# Patient Record
Sex: FEMALE | Race: WHITE | NOT HISPANIC OR LATINO | Employment: UNEMPLOYED | URBAN - METROPOLITAN AREA
[De-identification: names, ages, dates, MRNs, and addresses within clinical notes are randomized per-mention and may not be internally consistent; named-entity substitution may affect disease eponyms.]

---

## 2018-10-13 ENCOUNTER — APPOINTMENT (OUTPATIENT)
Dept: RADIOLOGY | Facility: MEDICAL CENTER | Age: 33
End: 2018-10-13
Payer: MEDICAID

## 2018-10-13 ENCOUNTER — HOSPITAL ENCOUNTER (EMERGENCY)
Facility: MEDICAL CENTER | Age: 33
End: 2018-10-13
Attending: EMERGENCY MEDICINE
Payer: MEDICAID

## 2018-10-13 VITALS
TEMPERATURE: 98.6 F | RESPIRATION RATE: 18 BRPM | DIASTOLIC BLOOD PRESSURE: 81 MMHG | WEIGHT: 203.71 LBS | OXYGEN SATURATION: 96 % | BODY MASS INDEX: 28.52 KG/M2 | HEART RATE: 67 BPM | SYSTOLIC BLOOD PRESSURE: 125 MMHG | HEIGHT: 71 IN

## 2018-10-13 DIAGNOSIS — M79.602 MUSCULOSKELETAL ARM PAIN, LEFT: ICD-10-CM

## 2018-10-13 DIAGNOSIS — R07.9 CHEST PAIN, UNSPECIFIED TYPE: ICD-10-CM

## 2018-10-13 LAB
ALBUMIN SERPL BCP-MCNC: 4.1 G/DL (ref 3.2–4.9)
ALBUMIN/GLOB SERPL: 1.2 G/DL
ALP SERPL-CCNC: 65 U/L (ref 30–99)
ALT SERPL-CCNC: 17 U/L (ref 2–50)
ANION GAP SERPL CALC-SCNC: 8 MMOL/L (ref 0–11.9)
APTT PPP: 27.5 SEC (ref 24.7–36)
AST SERPL-CCNC: 15 U/L (ref 12–45)
BASOPHILS # BLD AUTO: 0.8 % (ref 0–1.8)
BASOPHILS # BLD: 0.05 K/UL (ref 0–0.12)
BILIRUB SERPL-MCNC: 0.5 MG/DL (ref 0.1–1.5)
BNP SERPL-MCNC: 10 PG/ML (ref 0–100)
BUN SERPL-MCNC: 11 MG/DL (ref 8–22)
CALCIUM SERPL-MCNC: 9 MG/DL (ref 8.5–10.5)
CHLORIDE SERPL-SCNC: 106 MMOL/L (ref 96–112)
CO2 SERPL-SCNC: 22 MMOL/L (ref 20–33)
CREAT SERPL-MCNC: 0.63 MG/DL (ref 0.5–1.4)
EKG IMPRESSION: NORMAL
EOSINOPHIL # BLD AUTO: 0.04 K/UL (ref 0–0.51)
EOSINOPHIL NFR BLD: 0.6 % (ref 0–6.9)
ERYTHROCYTE [DISTWIDTH] IN BLOOD BY AUTOMATED COUNT: 38.5 FL (ref 35.9–50)
GLOBULIN SER CALC-MCNC: 3.5 G/DL (ref 1.9–3.5)
GLUCOSE SERPL-MCNC: 94 MG/DL (ref 65–99)
HCT VFR BLD AUTO: 40.4 % (ref 37–47)
HGB BLD-MCNC: 13.5 G/DL (ref 12–16)
IMM GRANULOCYTES # BLD AUTO: 0.01 K/UL (ref 0–0.11)
IMM GRANULOCYTES NFR BLD AUTO: 0.2 % (ref 0–0.9)
INR PPP: 1.06 (ref 0.87–1.13)
LIPASE SERPL-CCNC: 48 U/L (ref 11–82)
LYMPHOCYTES # BLD AUTO: 1.96 K/UL (ref 1–4.8)
LYMPHOCYTES NFR BLD: 30.2 % (ref 22–41)
MCH RBC QN AUTO: 27.2 PG (ref 27–33)
MCHC RBC AUTO-ENTMCNC: 33.4 G/DL (ref 33.6–35)
MCV RBC AUTO: 81.5 FL (ref 81.4–97.8)
MONOCYTES # BLD AUTO: 0.48 K/UL (ref 0–0.85)
MONOCYTES NFR BLD AUTO: 7.4 % (ref 0–13.4)
NEUTROPHILS # BLD AUTO: 3.95 K/UL (ref 2–7.15)
NEUTROPHILS NFR BLD: 60.8 % (ref 44–72)
NRBC # BLD AUTO: 0 K/UL
NRBC BLD-RTO: 0 /100 WBC
PLATELET # BLD AUTO: 203 K/UL (ref 164–446)
PMV BLD AUTO: 10.5 FL (ref 9–12.9)
POTASSIUM SERPL-SCNC: 3.9 MMOL/L (ref 3.6–5.5)
PROT SERPL-MCNC: 7.6 G/DL (ref 6–8.2)
PROTHROMBIN TIME: 13.9 SEC (ref 12–14.6)
RBC # BLD AUTO: 4.96 M/UL (ref 4.2–5.4)
SODIUM SERPL-SCNC: 136 MMOL/L (ref 135–145)
TROPONIN I SERPL-MCNC: <0.01 NG/ML (ref 0–0.04)
WBC # BLD AUTO: 6.5 K/UL (ref 4.8–10.8)

## 2018-10-13 PROCEDURE — 85730 THROMBOPLASTIN TIME PARTIAL: CPT

## 2018-10-13 PROCEDURE — 85610 PROTHROMBIN TIME: CPT

## 2018-10-13 PROCEDURE — 83880 ASSAY OF NATRIURETIC PEPTIDE: CPT

## 2018-10-13 PROCEDURE — 36415 COLL VENOUS BLD VENIPUNCTURE: CPT

## 2018-10-13 PROCEDURE — 71045 X-RAY EXAM CHEST 1 VIEW: CPT

## 2018-10-13 PROCEDURE — 84484 ASSAY OF TROPONIN QUANT: CPT

## 2018-10-13 PROCEDURE — 700111 HCHG RX REV CODE 636 W/ 250 OVERRIDE (IP): Performed by: EMERGENCY MEDICINE

## 2018-10-13 PROCEDURE — 93005 ELECTROCARDIOGRAM TRACING: CPT | Performed by: EMERGENCY MEDICINE

## 2018-10-13 PROCEDURE — 80053 COMPREHEN METABOLIC PANEL: CPT

## 2018-10-13 PROCEDURE — 83690 ASSAY OF LIPASE: CPT

## 2018-10-13 PROCEDURE — 93005 ELECTROCARDIOGRAM TRACING: CPT

## 2018-10-13 PROCEDURE — A9270 NON-COVERED ITEM OR SERVICE: HCPCS | Performed by: EMERGENCY MEDICINE

## 2018-10-13 PROCEDURE — 85025 COMPLETE CBC W/AUTO DIFF WBC: CPT

## 2018-10-13 PROCEDURE — 700102 HCHG RX REV CODE 250 W/ 637 OVERRIDE(OP): Performed by: EMERGENCY MEDICINE

## 2018-10-13 PROCEDURE — 99284 EMERGENCY DEPT VISIT MOD MDM: CPT

## 2018-10-13 RX ORDER — PREDNISONE 20 MG/1
20 TABLET ORAL ONCE
Status: COMPLETED | OUTPATIENT
Start: 2018-10-13 | End: 2018-10-13

## 2018-10-13 RX ORDER — METHYLPREDNISOLONE 4 MG/1
TABLET ORAL
Qty: 1 KIT | Refills: 0 | Status: SHIPPED | OUTPATIENT
Start: 2018-10-13

## 2018-10-13 RX ORDER — IBUPROFEN 600 MG/1
600 TABLET ORAL ONCE
Status: COMPLETED | OUTPATIENT
Start: 2018-10-13 | End: 2018-10-13

## 2018-10-13 RX ADMIN — LIDOCAINE HYDROCHLORIDE 30 ML: 20 SOLUTION OROPHARYNGEAL at 14:22

## 2018-10-13 RX ADMIN — PREDNISONE 20 MG: 20 TABLET ORAL at 15:28

## 2018-10-13 RX ADMIN — IBUPROFEN 600 MG: 600 TABLET, FILM COATED ORAL at 15:28

## 2018-10-13 ASSESSMENT — PAIN SCALES - GENERAL: PAINLEVEL_OUTOF10: 6

## 2018-10-13 ASSESSMENT — LIFESTYLE VARIABLES: DO YOU DRINK ALCOHOL: NO

## 2018-10-13 NOTE — ED PROVIDER NOTES
"ED Provider Note    Scribed for Umer Lucia M.D. by Barbie Ndiaye. 10/13/2018  2:01 PM    Primary care provider: PCP, Pt states none  Means of arrival: Walk-in  History obtained from: Patient  History limited by: None    CHIEF COMPLAINT  Chief Complaint   Patient presents with   • Chest Pain     in left side radiating to arm x 2 days. describes pain as intermittent and achy, worse during movement or deep inspiration.   • Cough     dry cough x 2 days.        HPI  Rupa Collins is a 33 y.o. female who presents to the Emergency Department for evaluation of pressure-like pain to her left upper chest with radiation to her back, left shoulder, and left arm, onset 2 days ago. Patient denies any right chest pain. Her pain worsens with deep breathing, coughing, and exertion. She has had a cough since yesterday and has had increased frequency of burping. Patient denies any abdominal tenderness, shortness of breath, diaphoresis, leg pain, or fevers. She did not engage in any physical activity prior to symptom onset. No family history of blood clots.     REVIEW OF SYSTEMS  Pertinent negatives include no abdominal tenderness, shortness of breath, diaphoresis, leg pain, fevers. As above, all other systems reviewed and are negative.   See HPI for further details.     PAST MEDICAL HISTORY       SURGICAL HISTORY  patient denies any surgical history    SOCIAL HISTORY  Social History   Substance Use Topics   • Smoking status: Not noted   • Smokeless tobacco: Not noted   • Alcohol use Not noted      History   Drug use: Unknown       FAMILY HISTORY  None noted     CURRENT MEDICATIONS  None noted    ALLERGIES  No Known Allergies    PHYSICAL EXAM  VITAL SIGNS: /81   Pulse 94   Temp 37 °C (98.6 °F) (Temporal)   Resp 16   Ht 1.8 m (5' 10.87\")   Wt 92.4 kg (203 lb 11.3 oz)   SpO2 99%   BMI 28.52 kg/m²     Constitutional: Well developed, Well nourished, No acute distress, Non-toxic appearance.   HENT: Normocephalic, " Atraumatic, Bilateral external ears normal, Oropharynx is clear mucous membranes are moist. No oral exudates or nasal discharge.   Eyes: Pupils are equal round and reactive, EOMI, Conjunctiva normal, No discharge.   Neck: Normal range of motion, No tenderness, Supple, No stridor. No meningismus.  Lymphatic: No lymphadenopathy noted.   Cardiovascular: Regular rate and rhythm without murmur rub or gallop.  Thorax & Lungs: Clear breath sounds bilaterally without wheezes, rhonchi or rales. Tenderness to palpation to left lower lateral chest wall and left anterior shoulder.   Abdomen: Moderately obese, Soft non-tender non-distended. There is no rebound or guarding. No organomegaly is appreciated.   Skin: Normal without rash.   Extremities: Intact distal pulses, No edema, No lower extremity tenderness, No cyanosis, No clubbing. Capillary refill is less than 2 seconds.  Musculoskeletal: Point tenderness to bicipital insertion of left shoulder with forced resistance. No major deformities noted.   Neurologic: Alert & oriented x 3, Normal motor function, Normal sensory function, No focal deficits noted. Reflexes are normal.  Psychiatric: Affect normal, Judgment normal, Mood normal. There is no suicidal ideation or patient reported hallucinations.       DIAGNOSTIC STUDIES / PROCEDURES    LABS  Labs Reviewed   CBC WITH DIFFERENTIAL - Abnormal; Notable for the following:        Result Value    MCHC 33.4 (*)     All other components within normal limits    Narrative:     Indicate which anticoagulants the patient is on:->UNKNOWN   TROPONIN    Narrative:     Indicate which anticoagulants the patient is on:->UNKNOWN   BTYPE NATRIURETIC PEPTIDE    Narrative:     Indicate which anticoagulants the patient is on:->UNKNOWN   COMP METABOLIC PANEL    Narrative:     Indicate which anticoagulants the patient is on:->UNKNOWN   PROTHROMBIN TIME    Narrative:     Indicate which anticoagulants the patient is on:->UNKNOWN   APTT    Narrative:      Indicate which anticoagulants the patient is on:->UNKNOWN   LIPASE    Narrative:     Indicate which anticoagulants the patient is on:->UNKNOWN   ESTIMATED GFR    Narrative:     Indicate which anticoagulants the patient is on:->UNKNOWN   All labs reviewed by me.    EKG Interpretation:  Results for orders placed or performed during the hospital encounter of 10/13/18   EKG   Result Value Ref Range    Report       [facility]    Test Date:  2018-10-13  Pt Name:    BOB BRYSON                 Department: ER  MRN:        [id number]                      Room:  Gender:     Female                       Technician: [id number]  :        1985                   Requested By:ER TRIAGE PROTOCOL  Order #:    [id number]                    Reading MD:    Measurements  Intervals                                Axis  Rate:       87                           P:          55  OR:         120                          QRS:        -41  QRSD:       104                          T:          41  QT:         364  QTc:        438    Interpretive Statements  SINUS RHYTHM  LEFT AXIS DEVIATION  No previous ECG available for comparison           RADIOLOGY  DX-CHEST-LIMITED (1 VIEW)   Final Result      No radiographic evidence of acute cardiopulmonary process.      The radiologist's interpretation of all radiological studies have been reviewed by me.    COURSE & MEDICAL DECISION MAKING  Nursing notes, VS, PMSFHx reviewed in chart.    2:01 PM Patient seen and examined at bedside. PERC score 0. Ordered for DX Chest, Troponin, BNP, CBC, CMP, Prothrombin Time, APTT, Lipase, EKG to evaluate. Patient was treated with GI Cocktail for her symptoms.      Chest x-ray was unremarkable showing no evidence of cardiomegaly or airspace disease.    Laboratory evaluation reveals normal troponin, no leukocytosis, shift, anemia, electrolyte derangements, acidosis.  INR is normal.    3:19 PM Patient has point tenderness to her left  bicipital insertion on re-evaluation. I believe the etiology of her symptoms is likely musculoskeletal. Will treat patient with Deltasone 20 mg and Motrin 600 mg. The patient will be discharged with a Medrol Dosepak and should return if symptoms worsen or if new symptoms arise. The patient understands and agrees to plan.       DISPOSITION:  Patient will be discharged home in stable condition.    FOLLOW UP:  Ibrahima Sequeira M.D.  [address] [facility] [address]  [phone]          OUTPATIENT MEDICATIONS:  New Prescriptions    METHYLPREDNISOLONE (MEDROL DOSEPAK) 4 MG TABLET THERAPY PACK    Use as directed       FINAL IMPRESSION  1. Musculoskeletal arm pain, left    2. Chest pain, unspecified type          I, Barbie Ndiaye (Louisaibe), am scribing for, and in the presence of, Umer Lucia M.D..    Electronically signed by: Barbie Ndiaye (Scribe), 10/13/2018    IUmer M.D. personally performed the services described in this documentation, as scribed by Barbie Ndiaye in my presence, and it is both accurate and complete. C.     The note accurately reflects work and decisions made by me.  Umer Lucia  10/13/2018  3:31 PM

## 2018-10-13 NOTE — ED TRIAGE NOTES
Chief Complaint   Patient presents with   • Chest Pain     in left side radiating to arm x 2 days. describes pain as intermittent and achy, worse during movement or deep inspiration.   • Cough     dry cough x 2 days.      Denies cardiac history. Pt speak Setswana,  translates for the pt. Educated on triage process. Instructed to notify staff for any worsening symptoms.

## 2018-10-13 NOTE — ED NOTES
Pt medicated per orders - see MAR for details. VSS. PIV DC'd intact. Pt given DC instructions, including medication education, with verbalized understanding. Ambulatory to exit with steady gait.

## 2018-12-10 ENCOUNTER — HOSPITAL ENCOUNTER (OUTPATIENT)
Dept: LAB | Facility: MEDICAL CENTER | Age: 33
End: 2018-12-10
Attending: OBSTETRICS & GYNECOLOGY
Payer: MEDICAID

## 2018-12-10 LAB
FSH SERPL-ACNC: 6.7 MIU/ML
LH SERPL-ACNC: 9 IU/L
PROLACTIN SERPL-MCNC: 16.48 NG/ML (ref 2.8–26)
T4 FREE SERPL-MCNC: 0.78 NG/DL (ref 0.53–1.43)
TSH SERPL DL<=0.005 MIU/L-ACNC: 2.81 UIU/ML (ref 0.38–5.33)

## 2018-12-10 PROCEDURE — 84146 ASSAY OF PROLACTIN: CPT

## 2018-12-10 PROCEDURE — 83001 ASSAY OF GONADOTROPIN (FSH): CPT

## 2018-12-10 PROCEDURE — 84443 ASSAY THYROID STIM HORMONE: CPT

## 2018-12-10 PROCEDURE — 84439 ASSAY OF FREE THYROXINE: CPT

## 2018-12-10 PROCEDURE — 36415 COLL VENOUS BLD VENIPUNCTURE: CPT

## 2018-12-10 PROCEDURE — 83002 ASSAY OF GONADOTROPIN (LH): CPT

## 2022-08-03 NOTE — ED NOTES
Acute pyelonephritis Noted on CT scan, coupled with flank pain  Allergic to cephalexin, recently on levaquin outpatient  Started on IV ciprofloxacin by the ED  Unclear if her allergy to cephalexin extends to all penicillins and cephalosporins, would continue with ciprofloxacin for now  -ID following, on cipro. Urine cx no growth.    Assumed care of pt upon returning from lunch. VSS on monitor. PIV placed with blood draw. Fall precautions in place. Call bell in reach. Family at bedside. Ongoing monitoring.